# Patient Record
Sex: FEMALE | Race: BLACK OR AFRICAN AMERICAN
[De-identification: names, ages, dates, MRNs, and addresses within clinical notes are randomized per-mention and may not be internally consistent; named-entity substitution may affect disease eponyms.]

---

## 2018-10-06 NOTE — CT
HEAD CT WITHOUT CONTRAST:

10/6/18

 

COMPARISON:  

8/7/17.

 

HISTORY: 

Dizziness, fall, pain.

 

TECHNIQUE:  

Serial axial CT imaging at 5 mm intervals from vertex through skull base without contrast. Coronal an
d sagittal reformatted imaging obtained. 

 

FINDINGS:  

The imaged paranasal sinuses and mastoid air cells are well aerated. There is no displaced calvarial 
fracture. 

 

No intracranial hemorrhage, midline shift, mass effect, or ventricular enlargement. 

 

IMPRESSION:  

No acute findings. 

 

POS: [facility]

## 2018-10-06 NOTE — CT
CT OF THE LUMBAR SPINE 

10/6/18

 

COMPARISON:  

12/4/15.

 

HISTORY: 

Fall, trauma, pain.

 

TECHNIQUE:  

Serial axial CT imaging at 2.5 mm intervals through the lumbar spine without contrast. Coronal and sa
gittal reformatted imaging obtained. 

 

FINDINGS:  

Evaluation for central canal and/or neural foraminal stenosis is limited on routine CT. 

 

Incompletely imaged postoperative clips are noted within the right upper quadrant. 

 

Incompletely imaged dorsal column stimulating leads are present, entering the spinal canal at the tho
racolumbar junction. 

 

Lumbar vertebral body height and alignment appears normal. 

 

No acute fracture or evidence of dislocation is seen. 

 

T12-L1: No osseous cause of significant central canal or neural foraminal stenosis. 

 

L1-2: No osseous cause of significant central canal or neural foraminal stenosis. 

 

L2-3: No osseous cause of significant central canal or neural foraminal stenosis. 

 

L3-4: Facet hypertrophy is noted on the left. Probable mild left neural foraminal stenosis. 

 

L4-5: There is disc bulge and mild posterior osteophyte with mild central canal stenosis suspected. N
o osseous cause of significant neural foraminal stenosis. 

 

L5-S1: Bilateral facet hypertrophy, right greater than left. Probable mild right neural foraminal jamey
nosis 

 

IMPRESSION:  

No acute osseous abnormality.

 

POS: [facility]

## 2018-10-06 NOTE — RAD
RIGHT ELBOW TWO VIEWS:

10/6/18

 

COMPARISON:  

None.

 

HISTORY: 

Trauma, pain. 

 

FINDINGS:  

Evaluation of the elbow joint is limited on two view examination. Lack of oblique imaging limits deta
iled assessment. No obvious elbow joint effusion, displaced fracture, or evidence of dislocation.

 

IMPRESSION:  

No acute findings. If symptoms persists, a full four view examination of the elbow in 7-10 days advis
ed. 

 

POS: [facility]

## 2021-05-07 ENCOUNTER — HOSPITAL ENCOUNTER (EMERGENCY)
Dept: HOSPITAL 18 - NAV ERS | Age: 46
Discharge: HOME | End: 2021-05-07
Payer: COMMERCIAL

## 2021-05-07 DIAGNOSIS — I10: ICD-10-CM

## 2021-05-07 DIAGNOSIS — M06.9: ICD-10-CM

## 2021-05-07 DIAGNOSIS — K21.9: ICD-10-CM

## 2021-05-07 DIAGNOSIS — R56.9: Primary | ICD-10-CM

## 2021-05-07 DIAGNOSIS — Z79.899: ICD-10-CM

## 2021-05-07 LAB
ALBUMIN SERPL BCG-MCNC: 3.8 G/DL (ref 3.5–5)
ALP SERPL-CCNC: 180 U/L (ref 40–110)
ALT SERPL W P-5'-P-CCNC: 19 U/L (ref 8–55)
ANION GAP SERPL CALC-SCNC: 14 MMOL/L (ref 10–20)
AST SERPL-CCNC: 26 U/L (ref 5–34)
BASOPHILS # BLD AUTO: 0 THOU/UL (ref 0–0.2)
BASOPHILS NFR BLD AUTO: 0.8 % (ref 0–1)
BILIRUB SERPL-MCNC: 0.4 MG/DL (ref 0.2–1.2)
BUN SERPL-MCNC: 16 MG/DL (ref 7–18.7)
CALCIUM SERPL-MCNC: 8.4 MG/DL (ref 7.8–10.44)
CHLORIDE SERPL-SCNC: 108 MMOL/L (ref 98–107)
CO2 SERPL-SCNC: 18 MMOL/L (ref 22–29)
CREAT CL PREDICTED SERPL C-G-VRATE: 0 ML/MIN (ref 70–130)
DRUG SCREEN CUTOFF: (no result)
EOSINOPHIL # BLD AUTO: 0.2 THOU/UL (ref 0–0.7)
EOSINOPHIL NFR BLD AUTO: 3.9 % (ref 0–10)
GLOBULIN SER CALC-MCNC: 3.4 G/DL (ref 2.4–3.5)
GLUCOSE SERPL-MCNC: 100 MG/DL (ref 70–105)
HGB BLD-MCNC: 10.6 G/DL (ref 12–16)
LYMPHOCYTES # BLD AUTO: 1 THOU/UL (ref 1.2–3.4)
LYMPHOCYTES NFR BLD AUTO: 22.8 % (ref 21–51)
MCH RBC QN AUTO: 20.3 PG (ref 27–31)
MCV RBC AUTO: 75.7 FL (ref 78–98)
MEDTOX CONTROL LINE VALID?: (no result)
MONOCYTES # BLD AUTO: 0.4 THOU/UL (ref 0.11–0.59)
MONOCYTES NFR BLD AUTO: 8.2 % (ref 0–10)
NEUTROPHILS # BLD AUTO: 2.8 THOU/UL (ref 1.4–6.5)
NEUTROPHILS NFR BLD AUTO: 64.3 % (ref 42–75)
PLATELET # BLD AUTO: 296 THOU/UL (ref 130–400)
POTASSIUM SERPL-SCNC: 3.7 MMOL/L (ref 3.5–5.1)
RBC # BLD AUTO: 5.23 MILL/UL (ref 4.2–5.4)
SODIUM SERPL-SCNC: 136 MMOL/L (ref 136–145)
SP GR UR STRIP: 1.02 (ref 1–1.03)
WBC # BLD AUTO: 4.3 THOU/UL (ref 4.8–10.8)

## 2021-05-07 PROCEDURE — 85025 COMPLETE CBC W/AUTO DIFF WBC: CPT

## 2021-05-07 PROCEDURE — 99284 EMERGENCY DEPT VISIT MOD MDM: CPT

## 2021-05-07 PROCEDURE — 80306 DRUG TEST PRSMV INSTRMNT: CPT

## 2021-05-07 PROCEDURE — 80053 COMPREHEN METABOLIC PANEL: CPT

## 2021-05-07 PROCEDURE — 81003 URINALYSIS AUTO W/O SCOPE: CPT

## 2022-11-10 ENCOUNTER — HOSPITAL ENCOUNTER (EMERGENCY)
Dept: HOSPITAL 18 - NAV ERS | Age: 47
LOS: 1 days | Discharge: HOME | End: 2022-11-11
Payer: COMMERCIAL

## 2022-11-10 DIAGNOSIS — I10: ICD-10-CM

## 2022-11-10 DIAGNOSIS — R07.81: Primary | ICD-10-CM

## 2022-11-10 DIAGNOSIS — K21.9: ICD-10-CM

## 2022-11-10 LAB
ALBUMIN SERPL BCG-MCNC: 3.9 G/DL (ref 3.5–5)
ALP SERPL-CCNC: 155 U/L (ref 40–110)
ALT SERPL W P-5'-P-CCNC: 54 U/L (ref 8–55)
ANION GAP SERPL CALC-SCNC: 12 MMOL/L (ref 10–20)
AST SERPL-CCNC: 38 U/L (ref 5–34)
BASOPHILS # BLD AUTO: 0.1 THOU/UL (ref 0–0.2)
BASOPHILS NFR BLD AUTO: 1.2 % (ref 0–1)
BILIRUB SERPL-MCNC: 0.5 MG/DL (ref 0.2–1.2)
BUN SERPL-MCNC: 13 MG/DL (ref 7–18.7)
CALCIUM SERPL-MCNC: 9.3 MG/DL (ref 7.8–10.44)
CHLORIDE SERPL-SCNC: 107 MMOL/L (ref 98–107)
CO2 SERPL-SCNC: 24 MMOL/L (ref 22–29)
CREAT CL PREDICTED SERPL C-G-VRATE: 0 ML/MIN (ref 70–130)
EOSINOPHIL # BLD AUTO: 0.3 THOU/UL (ref 0–0.7)
EOSINOPHIL NFR BLD AUTO: 6.3 % (ref 0–10)
GLOBULIN SER CALC-MCNC: 3.5 G/DL (ref 2.4–3.5)
GLUCOSE SERPL-MCNC: 88 MG/DL (ref 70–105)
HGB BLD-MCNC: 14.5 G/DL (ref 12–16)
LIPASE SERPL-CCNC: 32 U/L (ref 8–78)
LYMPHOCYTES # BLD AUTO: 2.3 THOU/UL (ref 1.2–3.4)
LYMPHOCYTES NFR BLD AUTO: 40.7 % (ref 21–51)
MCH RBC QN AUTO: 27.1 PG (ref 27–31)
MCV RBC AUTO: 84.6 FL (ref 78–98)
MONOCYTES # BLD AUTO: 0.3 THOU/UL (ref 0.11–0.59)
MONOCYTES NFR BLD AUTO: 4.9 % (ref 0–10)
NEUTROPHILS # BLD AUTO: 2.6 THOU/UL (ref 1.4–6.5)
NEUTROPHILS NFR BLD AUTO: 46.9 % (ref 42–75)
PLATELET # BLD AUTO: 223 10X3/UL (ref 130–400)
POTASSIUM SERPL-SCNC: 3.6 MMOL/L (ref 3.5–5.1)
RBC # BLD AUTO: 5.35 MILL/UL (ref 4.2–5.4)
SODIUM SERPL-SCNC: 139 MMOL/L (ref 136–145)
WBC # BLD AUTO: 5.6 10X3/UL (ref 4.8–10.8)

## 2022-11-10 PROCEDURE — 93005 ELECTROCARDIOGRAM TRACING: CPT

## 2022-11-10 PROCEDURE — 84484 ASSAY OF TROPONIN QUANT: CPT

## 2022-11-10 PROCEDURE — 83690 ASSAY OF LIPASE: CPT

## 2022-11-10 PROCEDURE — 85025 COMPLETE CBC W/AUTO DIFF WBC: CPT

## 2022-11-10 PROCEDURE — 85379 FIBRIN DEGRADATION QUANT: CPT

## 2022-11-10 PROCEDURE — 96374 THER/PROPH/DIAG INJ IV PUSH: CPT

## 2022-11-10 PROCEDURE — 71045 X-RAY EXAM CHEST 1 VIEW: CPT

## 2022-11-10 PROCEDURE — 80053 COMPREHEN METABOLIC PANEL: CPT

## 2022-11-11 LAB — TROPONIN I SERPL DL<=0.01 NG/ML-MCNC: (no result) NG/ML (ref ?–0.03)
